# Patient Record
Sex: MALE | Race: WHITE | NOT HISPANIC OR LATINO | URBAN - METROPOLITAN AREA
[De-identification: names, ages, dates, MRNs, and addresses within clinical notes are randomized per-mention and may not be internally consistent; named-entity substitution may affect disease eponyms.]

---

## 2019-01-28 ENCOUNTER — EMERGENCY (EMERGENCY)
Facility: HOSPITAL | Age: 34
LOS: 1 days | Discharge: HOME | End: 2019-01-28
Attending: STUDENT IN AN ORGANIZED HEALTH CARE EDUCATION/TRAINING PROGRAM
Payer: COMMERCIAL

## 2019-01-28 VITALS
RESPIRATION RATE: 18 BRPM | WEIGHT: 270.07 LBS | TEMPERATURE: 98 F | OXYGEN SATURATION: 100 % | HEART RATE: 75 BPM | SYSTOLIC BLOOD PRESSURE: 152 MMHG | HEIGHT: 73 IN | DIASTOLIC BLOOD PRESSURE: 107 MMHG

## 2019-01-28 PROCEDURE — 99053 MED SERV 10PM-8AM 24 HR FAC: CPT

## 2019-01-28 PROCEDURE — 99284 EMERGENCY DEPT VISIT MOD MDM: CPT | Mod: 25

## 2019-01-29 VITALS — TEMPERATURE: 98 F | RESPIRATION RATE: 18 BRPM | HEART RATE: 64 BPM | OXYGEN SATURATION: 100 %

## 2019-01-29 DIAGNOSIS — Z90.49 ACQUIRED ABSENCE OF OTHER SPECIFIED PARTS OF DIGESTIVE TRACT: Chronic | ICD-10-CM

## 2019-01-29 LAB
ALBUMIN SERPL ELPH-MCNC: 4.9 G/DL — SIGNIFICANT CHANGE UP (ref 3.3–5.2)
ALP SERPL-CCNC: 54 U/L — SIGNIFICANT CHANGE UP (ref 40–120)
ALT FLD-CCNC: 45 U/L — HIGH
ANION GAP SERPL CALC-SCNC: 12 MMOL/L — SIGNIFICANT CHANGE UP (ref 5–17)
AST SERPL-CCNC: 31 U/L — SIGNIFICANT CHANGE UP
BASOPHILS # BLD AUTO: 0 K/UL — SIGNIFICANT CHANGE UP (ref 0–0.2)
BASOPHILS NFR BLD AUTO: 0.3 % — SIGNIFICANT CHANGE UP (ref 0–2)
BILIRUB SERPL-MCNC: 0.6 MG/DL — SIGNIFICANT CHANGE UP (ref 0.4–2)
BUN SERPL-MCNC: 11 MG/DL — SIGNIFICANT CHANGE UP (ref 8–20)
CALCIUM SERPL-MCNC: 9.8 MG/DL — SIGNIFICANT CHANGE UP (ref 8.6–10.2)
CHLORIDE SERPL-SCNC: 100 MMOL/L — SIGNIFICANT CHANGE UP (ref 98–107)
CK SERPL-CCNC: 142 U/L — SIGNIFICANT CHANGE UP (ref 30–200)
CO2 SERPL-SCNC: 26 MMOL/L — SIGNIFICANT CHANGE UP (ref 22–29)
CREAT SERPL-MCNC: 0.84 MG/DL — SIGNIFICANT CHANGE UP (ref 0.5–1.3)
EOSINOPHIL # BLD AUTO: 0.2 K/UL — SIGNIFICANT CHANGE UP (ref 0–0.5)
EOSINOPHIL NFR BLD AUTO: 2.3 % — SIGNIFICANT CHANGE UP (ref 0–5)
GLUCOSE SERPL-MCNC: 101 MG/DL — SIGNIFICANT CHANGE UP (ref 70–115)
HCT VFR BLD CALC: 46.2 % — SIGNIFICANT CHANGE UP (ref 42–52)
HGB BLD-MCNC: 16 G/DL — SIGNIFICANT CHANGE UP (ref 14–18)
LYMPHOCYTES # BLD AUTO: 3.2 K/UL — SIGNIFICANT CHANGE UP (ref 1–4.8)
LYMPHOCYTES # BLD AUTO: 32.9 % — SIGNIFICANT CHANGE UP (ref 20–55)
MCHC RBC-ENTMCNC: 30.4 PG — SIGNIFICANT CHANGE UP (ref 27–31)
MCHC RBC-ENTMCNC: 34.6 G/DL — SIGNIFICANT CHANGE UP (ref 32–36)
MCV RBC AUTO: 87.8 FL — SIGNIFICANT CHANGE UP (ref 80–94)
MONOCYTES # BLD AUTO: 0.6 K/UL — SIGNIFICANT CHANGE UP (ref 0–0.8)
MONOCYTES NFR BLD AUTO: 5.8 % — SIGNIFICANT CHANGE UP (ref 3–10)
NEUTROPHILS # BLD AUTO: 5.6 K/UL — SIGNIFICANT CHANGE UP (ref 1.8–8)
NEUTROPHILS NFR BLD AUTO: 58.4 % — SIGNIFICANT CHANGE UP (ref 37–73)
PLATELET # BLD AUTO: 277 K/UL — SIGNIFICANT CHANGE UP (ref 150–400)
POTASSIUM SERPL-MCNC: 4.1 MMOL/L — SIGNIFICANT CHANGE UP (ref 3.5–5.3)
POTASSIUM SERPL-SCNC: 4.1 MMOL/L — SIGNIFICANT CHANGE UP (ref 3.5–5.3)
PROT SERPL-MCNC: 7.5 G/DL — SIGNIFICANT CHANGE UP (ref 6.6–8.7)
RBC # BLD: 5.26 M/UL — SIGNIFICANT CHANGE UP (ref 4.6–6.2)
RBC # FLD: 12.6 % — SIGNIFICANT CHANGE UP (ref 11–15.6)
SODIUM SERPL-SCNC: 138 MMOL/L — SIGNIFICANT CHANGE UP (ref 135–145)
TROPONIN T SERPL-MCNC: <0.01 NG/ML — SIGNIFICANT CHANGE UP (ref 0–0.06)
WBC # BLD: 9.6 K/UL — SIGNIFICANT CHANGE UP (ref 4.8–10.8)
WBC # FLD AUTO: 9.6 K/UL — SIGNIFICANT CHANGE UP (ref 4.8–10.8)

## 2019-01-29 PROCEDURE — 80053 COMPREHEN METABOLIC PANEL: CPT

## 2019-01-29 PROCEDURE — 93005 ELECTROCARDIOGRAM TRACING: CPT

## 2019-01-29 PROCEDURE — 70450 CT HEAD/BRAIN W/O DYE: CPT | Mod: 26

## 2019-01-29 PROCEDURE — 70450 CT HEAD/BRAIN W/O DYE: CPT

## 2019-01-29 PROCEDURE — 93010 ELECTROCARDIOGRAM REPORT: CPT

## 2019-01-29 PROCEDURE — 82550 ASSAY OF CK (CPK): CPT

## 2019-01-29 PROCEDURE — 36415 COLL VENOUS BLD VENIPUNCTURE: CPT

## 2019-01-29 PROCEDURE — 96374 THER/PROPH/DIAG INJ IV PUSH: CPT

## 2019-01-29 PROCEDURE — 85027 COMPLETE CBC AUTOMATED: CPT

## 2019-01-29 PROCEDURE — 99284 EMERGENCY DEPT VISIT MOD MDM: CPT | Mod: 25

## 2019-01-29 PROCEDURE — 84484 ASSAY OF TROPONIN QUANT: CPT

## 2019-01-29 RX ORDER — METOCLOPRAMIDE HCL 10 MG
10 TABLET ORAL ONCE
Qty: 0 | Refills: 0 | Status: COMPLETED | OUTPATIENT
Start: 2019-01-29 | End: 2019-01-29

## 2019-01-29 RX ORDER — SODIUM CHLORIDE 9 MG/ML
1000 INJECTION INTRAMUSCULAR; INTRAVENOUS; SUBCUTANEOUS ONCE
Qty: 0 | Refills: 0 | Status: COMPLETED | OUTPATIENT
Start: 2019-01-29 | End: 2019-01-29

## 2019-01-29 RX ADMIN — Medication 10 MILLIGRAM(S): at 00:57

## 2019-01-29 RX ADMIN — Medication 1 TABLET(S): at 00:56

## 2019-01-29 RX ADMIN — SODIUM CHLORIDE 1000 MILLILITER(S): 9 INJECTION INTRAMUSCULAR; INTRAVENOUS; SUBCUTANEOUS at 00:57

## 2019-01-29 NOTE — ED ADULT NURSE NOTE - OBJECTIVE STATEMENT
Pt with 10/10 HA for 2 days with associated dizziness and light-headedness. Pt states he has hx of migraines and that his BP has been higher than usual recently so maybe that is why he has a HA. Pt denies visual disturbances, nausea, vomiting, balance problems.

## 2019-01-29 NOTE — ED PROVIDER NOTE - ATTENDING CONTRIBUTION TO CARE
32 yo (whom is also a new father to a new born) presents for evaluation of headache and sensation dizziness with coleman of certain positions. I personally saw the patient with the PA, and completed the key components of the history and physical exam. I then discussed the management plan with the PA. Patient  likely with symptoms secondary to new fatigue. however, followup strongly recommended given elevated bp.

## 2019-01-29 NOTE — ED ADULT NURSE NOTE - NSIMPLEMENTINTERV_GEN_ALL_ED
Implemented All Universal Safety Interventions:  [place] to call system. Call bell, personal items and telephone within reach. Instruct patient to call for assistance. Room bathroom lighting operational. Non-slip footwear when patient is off stretcher. Physically safe environment: no spills, clutter or unnecessary equipment. Stretcher in lowest position, wheels locked, appropriate side rails in place.

## 2019-01-29 NOTE — ED PROVIDER NOTE - MEDICAL DECISION MAKING DETAILS
PT with stable VS, no acute distress, non toxic appearing, tolerating PO in the ED, PT Jesus intact, labs show no signes of card strain normal EKG, Pt symptoms likely due to new family stressors including new baby. will dc home with follow up to cards, jesus educated about when to return to the ED if needed. PT verbalizes that he understands all instructions and results.   PT informed of incidental findings on CT.

## 2019-01-29 NOTE — ED PROVIDER NOTE - OBJECTIVE STATEMENT
34 y/o M with PMHx of HTN (on Atenolol) and SVT and PSHx of cholecystectomy presents to ED c/o constant headache onset 2 days ago, described as dull, and "it feels like a chaitanya is going through my eyes". Has had headaches on and off for the past 1.5 years, but has never had imaging done for his symptoms. Additionally notes that he is experiencing intermittent episodes of lightheadedness than last 1-3 seconds in duration where he feels he is about to pass out. As per wife, the patient's blood pressure has been elevated for several days and his BP is normally "always okay". Takes Protonix, Singulair and Zyrtec daily. Allergic to Vicodin. Denies recent fevers/chills or illness, nausea, vomiting, chest pain, palpitations, difficulty breathing or syncope. Has not taken any medications OTC for symptoms. No further acute complaints at this time.